# Patient Record
Sex: FEMALE | Race: BLACK OR AFRICAN AMERICAN | NOT HISPANIC OR LATINO | URBAN - NONMETROPOLITAN AREA
[De-identification: names, ages, dates, MRNs, and addresses within clinical notes are randomized per-mention and may not be internally consistent; named-entity substitution may affect disease eponyms.]

---

## 2018-07-10 PROBLEM — H35.372: Noted: 2018-10-10

## 2018-07-10 PROBLEM — H35.033: Noted: 2018-10-10

## 2018-07-10 PROBLEM — H26.493: Noted: 2018-10-10

## 2018-07-10 PROBLEM — Z96.1: Noted: 2018-10-10

## 2018-07-10 PROBLEM — H04.123: Noted: 2018-07-10

## 2018-07-10 PROBLEM — E11.3293: Noted: 2018-10-10

## 2020-02-04 ENCOUNTER — IMPORTED ENCOUNTER (OUTPATIENT)
Dept: URBAN - NONMETROPOLITAN AREA CLINIC 1 | Facility: CLINIC | Age: 85
End: 2020-02-04

## 2020-02-04 PROCEDURE — 92012 INTRM OPH EXAM EST PATIENT: CPT

## 2020-02-04 NOTE — PATIENT DISCUSSION
SUZIE OU-Explained SUZIE and associated symptoms at length.-pt tried using fish oil not taking now-Pt tried artificial tears with no improvementPT TO TRY OMEGA 3 AND VIT A IF NO IMPROVEMENT PT TO CALL FOR PF FOR SHORT COURSEPT EDUCATED AND IF ELECTS WILL TRY PLUGS OR XIIDRADM with Diabetic Retinopathy OU-Stressed the importance of keeping blood sugars under control and regular visits with PCP. Hypertensive Retinopathy OU-recommend good BP controlPC IOL OU w/PCO progression OS>OD-stable continue to monitorERM OS-stable continue to monitor-Order OCT MAC today performed and reviewed with the patient. PCO-Explained PCO and RBAs of YAG Capsulotomy to pt. -Pt elects to proceed. REFER TO DR. REDMOND FOR CONSULT PER PT REQUEST

## 2020-02-29 ENCOUNTER — IMPORTED ENCOUNTER (OUTPATIENT)
Dept: URBAN - NONMETROPOLITAN AREA CLINIC 1 | Facility: CLINIC | Age: 85
End: 2020-02-29

## 2020-02-29 PROCEDURE — 66821 AFTER CATARACT LASER SURGERY: CPT

## 2020-02-29 PROCEDURE — 92014 COMPRE OPH EXAM EST PT 1/>: CPT

## 2020-02-29 NOTE — PATIENT DISCUSSION
PCO-Explained PCO and RBAs of YAG Capsulotomy to pt. -Pt elects to proceed.  YAG Caps OD today and YAG Caps OSin 1-2 weeks.-Per patient request to have right eye done first.-Consent signed

## 2020-03-06 ENCOUNTER — IMPORTED ENCOUNTER (OUTPATIENT)
Dept: URBAN - NONMETROPOLITAN AREA CLINIC 1 | Facility: CLINIC | Age: 85
End: 2020-03-06

## 2020-03-06 PROBLEM — H04.123: Noted: 2020-03-06

## 2020-03-06 PROBLEM — E11.3293: Noted: 2020-03-06

## 2020-03-06 PROBLEM — H26.493: Noted: 2020-03-06

## 2020-03-06 PROBLEM — Z96.1: Noted: 2020-03-06

## 2020-03-06 PROBLEM — H35.372: Noted: 2020-03-06

## 2020-03-06 PROBLEM — H35.033: Noted: 2020-03-06

## 2020-03-06 PROCEDURE — 99024 POSTOP FOLLOW-UP VISIT: CPT

## 2020-03-06 NOTE — PATIENT DISCUSSION
S/P YAG ODMONITORDES OU-Explained SUZIE and associated symptoms at length.-pt tried using fish oil not taking now-Pt tried artificial tears with no improvementPT TO TRY OMEGA 3 AND VIT A IF NO IMPROVEMENT PT TO CALL FOR PF FOR SHORT COURSEPT EDUCATED AND IF ELECTS WILL TRY PLUGS OR XIIDRADM with Diabetic Retinopathy OU-Stressed the importance of keeping blood sugars under control and regular visits with PCP. Hypertensive Retinopathy OU-recommend good BP controlPC IOL OU w/PCO progression OS>OD-stable continue to monitorERM OS-stable continue to monitor-Order OCT MAC today performed and reviewed with the patient.

## 2021-09-22 ENCOUNTER — IMPORTED ENCOUNTER (OUTPATIENT)
Dept: URBAN - NONMETROPOLITAN AREA CLINIC 1 | Facility: CLINIC | Age: 86
End: 2021-09-22

## 2021-09-22 PROBLEM — H35.372: Noted: 2021-09-22

## 2021-09-22 PROBLEM — Z96.1: Noted: 2021-09-22

## 2021-09-22 PROBLEM — H04.123: Noted: 2018-07-10

## 2021-09-22 PROBLEM — E11.3293: Noted: 2021-09-22

## 2021-09-22 PROCEDURE — 99213 OFFICE O/P EST LOW 20 MIN: CPT

## 2021-09-22 NOTE — PATIENT DISCUSSION
SUZIE OU-Explained SUZIE and associated symptoms at length.-pt tried using fish oil not taking now-Pt tried artificial tears with no improvementPT TO TRY OMEGA 3 AND VIT A IF NO IMPROVEMENT PT TO CALL FOR PF FOR SHORT COURSEPT EDUCATED AND IF ELECTS WILL TRY PLUGS OR XIIDRAPC IOL OU -stable continue to monitorERM OS-stable continue to monitor-Order OCT MAC today performed and reviewed with the patient. DM s DR-Stressed the importance of keeping blood sugars under control blood pressure under control and weight normalization and regular visits with PCP. -Explained the possible effects of poorly controlled diabetes and the damage that diabetes can cause to ocular health. -Patient to check HgbA1C.-Pt instructed to contact our office with any vision changes. dermatochalsis ou+progressionrefer per pt request

## 2021-09-24 ENCOUNTER — IMPORTED ENCOUNTER (OUTPATIENT)
Dept: URBAN - NONMETROPOLITAN AREA CLINIC 1 | Facility: CLINIC | Age: 86
End: 2021-09-24

## 2021-09-24 PROBLEM — E11.3293: Noted: 2021-09-22

## 2021-09-24 PROBLEM — Z96.1: Noted: 2021-09-22

## 2021-09-24 PROBLEM — H35.372: Noted: 2021-09-22

## 2021-09-24 PROBLEM — H02.413: Noted: 2021-09-24

## 2021-09-24 PROCEDURE — 92012 INTRM OPH EXAM EST PATIENT: CPT

## 2021-09-24 NOTE — PATIENT DISCUSSION
Ptosis-Ptosis (the upper eyelid being in a lower than normal position) of the upper eyelid was explained to the patient. -RBAs of ptosis repair discussed with patient. Treatment options include observation or surgical correction.-Inquired about Thyroid issues today due to her goiter looking enlarged upon examination she denies any issues but recommend pt have apt w/ PCP prior and then revealuate. Will call her PCP. Dr Eneida Woody Task sent to [facility]. *Pt not to be schedule for testing until she has evaluation w/ PCP. * NOTES BELOW FROM PREVIOUS: SUZIE OU-Explained SUZIE and associated symptoms at length.-pt tried using fish oil not taking now-Pt tried artificial tears with no improvementPT TO TRY OMEGA 3 AND VIT A IF NO IMPROVEMENT PT TO CALL FOR PF FOR SHORT COURSEPT EDUCATED AND IF ELECTS WILL TRY PLUGS OR [place] IOL OU -stable continue to monitorERM OS-stable continue to monitor-Order OCT MAC today performed and reviewed with the patient. DM s DR-Stressed the importance of keeping blood sugars under control blood pressure under control and weight normalization and regular visits with PCP. -Explained the possible effects of poorly controlled diabetes and the damage that diabetes can cause to ocular health. -Patient to check HgbA1C.-Pt instructed to contact our office with any vision changes. dermatochalsis ou+progressionrefer per pt request""

## 2022-04-10 ASSESSMENT — TONOMETRY
OD_IOP_MMHG: 22
OS_IOP_MMHG: 22
OD_IOP_MMHG: 22
OD_IOP_MMHG: 23
OS_IOP_MMHG: 22
OS_IOP_MMHG: 20
OD_IOP_MMHG: 20

## 2022-04-10 ASSESSMENT — VISUAL ACUITY
OD_CC: 20/30
OS_SC: 20/50-3
OD_SC: 20/25-2
OS_SC: 20/50-1
OS_GLARE: 20/100
OU_CC: 20/30
OS_CC: 20/30
OU_SC: 20/30
OS_SC: 20/50-
OD_GLARE: 20/50
OD_SC: 20/30-2
OS_SC: 20/50+2
OD_CC: 20/40-1
OD_SC: 20/30-1
OD_SC: 20/30